# Patient Record
Sex: MALE | Race: OTHER | Employment: UNEMPLOYED | URBAN - METROPOLITAN AREA
[De-identification: names, ages, dates, MRNs, and addresses within clinical notes are randomized per-mention and may not be internally consistent; named-entity substitution may affect disease eponyms.]

---

## 2020-05-26 ENCOUNTER — HOSPITAL ENCOUNTER (EMERGENCY)
Age: 25
Discharge: HOME | End: 2020-05-26
Attending: EMERGENCY MEDICINE
Payer: SELF-PAY

## 2020-05-26 VITALS
OXYGEN SATURATION: 99 % | RESPIRATION RATE: 18 BRPM | WEIGHT: 180 LBS | DIASTOLIC BLOOD PRESSURE: 63 MMHG | BODY MASS INDEX: 29.99 KG/M2 | HEIGHT: 65 IN | HEART RATE: 79 BPM | SYSTOLIC BLOOD PRESSURE: 108 MMHG | TEMPERATURE: 98.6 F

## 2020-05-26 DIAGNOSIS — T78.2XXA ANAPHYLAXIS, INITIAL ENCOUNTER: Primary | ICD-10-CM

## 2020-05-26 PROCEDURE — 96375 TX/PRO/DX INJ NEW DRUG ADDON: CPT

## 2020-05-26 PROCEDURE — 99284 EMERGENCY DEPT VISIT MOD MDM: CPT

## 2020-05-26 PROCEDURE — 96374 THER/PROPH/DIAG INJ IV PUSH: CPT

## 2020-05-26 PROCEDURE — 96372 THER/PROPH/DIAG INJ SC/IM: CPT

## 2020-05-26 PROCEDURE — 74011000250 HC RX REV CODE- 250: Performed by: EMERGENCY MEDICINE

## 2020-05-26 PROCEDURE — 74011250636 HC RX REV CODE- 250/636: Performed by: EMERGENCY MEDICINE

## 2020-05-26 RX ORDER — EPINEPHRINE 0.3 MG/.3ML
0.3 INJECTION SUBCUTANEOUS
Qty: 2 SYRINGE | Refills: 2 | Status: SHIPPED | OUTPATIENT
Start: 2020-05-26 | End: 2020-05-26

## 2020-05-26 RX ORDER — EPINEPHRINE 1 MG/ML
0.3 INJECTION, SOLUTION, CONCENTRATE INTRAVENOUS ONCE
Status: COMPLETED | OUTPATIENT
Start: 2020-05-26 | End: 2020-05-26

## 2020-05-26 RX ADMIN — EPINEPHRINE 0.3 MG: 1 INJECTION, SOLUTION, CONCENTRATE INTRAVENOUS at 16:00

## 2020-05-26 RX ADMIN — SODIUM CHLORIDE 1000 ML: 900 INJECTION, SOLUTION INTRAVENOUS at 15:53

## 2020-05-26 RX ADMIN — FAMOTIDINE 20 MG: 10 INJECTION, SOLUTION INTRAVENOUS at 16:01

## 2020-05-26 RX ADMIN — METHYLPREDNISOLONE SODIUM SUCCINATE 125 MG: 125 INJECTION, POWDER, FOR SOLUTION INTRAMUSCULAR; INTRAVENOUS at 16:00

## 2020-05-26 NOTE — ED NOTES
Attempted to get a hold of pt again and pt answered. States he will come back to get that epi pen. States he took both IV's out before he left.

## 2020-05-26 NOTE — ED TRIAGE NOTES
Pt arrives complaining of being stung by a bee. States he is unable \"to breath or see\". Pt extremely anxious on arrival. Pt with red arms and hives to arms. No facial swelling noted. Pt given 50mg Benadryl PO by family PTA.

## 2020-05-26 NOTE — DISCHARGE INSTRUCTIONS
Patient Education     Meds in our department:  Epinephrine  Pepcid  Solu-Medrol    Meds for home:  Benadryl 25 to 50 mg every 6-8 hours  Pepcid 20 mg every 12 hours    Return at any point with worsening, you are leaving before we do only discharge you at your specific request acknowledging that there is increased risk    EpiPen for acute reactions similar   Anaphylactic Reaction: Care Instructions  Your Care Instructions    A bad allergic reaction affects your whole body. Doctors call it an anaphylactic reaction. Your immune system may have reacted to food or medicine. Or maybe you had an insect bite or sting. This kind of reaction can happen the first time you come into contact with a substance. Or it may take many times before a substance causes a problem. You need to get help right away if your body reacts like this again. Follow-up care is a key part of your treatment and safety. Be sure to make and go to all appointments, and call your doctor if you are having problems. It's also a good idea to know your test results and keep a list of the medicines you take. How can you care for yourself at home? · If your doctor has prescribed medicine, such as an antihistamine, take it exactly as directed. Call your doctor if you think you are having a problem with your medicine. · Learn all you can about your allergies. You may be able to avoid a severe response when you do or don't do certain things. For instance, you can check food or drug labels for contents that might cause problems. · Your doctor may prescribe a shot of epinephrine to carry with you in case you have a severe reaction. Learn how to give yourself the shot. Keep it with you at all times. Make sure it has not . · Wear medical alert jewelry that lists your allergies. You can buy this at most [employer]. · Teach your family and friends about your allergies. Tell them what you need to avoid.  Teach them what to do if you have a reaction. · Before you take any medicine, tell your doctor if you have had a bad response to any medicines in the past.  When should you call for help? Give an epinephrine shot if:    · You think you are having a severe allergic reaction.    After giving an epinephrine shot call  911, even if you feel better.   Call 911 if:    · You have symptoms of a severe allergic reaction. These may include:  ? Sudden raised, red areas (hives) all over your body. ? Swelling of the throat, mouth, lips, or tongue. ? Trouble breathing. ? Passing out (losing consciousness). Or you may feel very lightheaded or suddenly feel weak, confused, or restless.     · You have been given an epinephrine shot, even if you feel better.    Call your doctor now or seek immediate medical care if:    · You have symptoms of an allergic reaction, such as:  ? A rash or hives (raised, red areas on the skin). ? Itching. ? Swelling. ? Belly pain, nausea, or vomiting.    Watch closely for changes in your health, and be sure to contact your doctor if:    · You do not get better as expected. Where can you learn more? Go to http://meredith-marjorie.info/  Enter E186 in the search box to learn more about \"Anaphylactic Reaction: Care Instructions. \"  Current as of: October 6, 2019Content Version: 12.4  © 3571-8885 [employer]. Care instructions adapted under license by [employer] (which disclaims liability or warranty for this information). If you have questions about a medical condition or this instruction, always ask your healthcare professional. [address] any warranty or liability for your use of this information.

## 2020-05-26 NOTE — ED NOTES
Went into room to discharge pt. Pt not in the room. One of his two IV's are on the bed. Will attempt to locate pt to give discharge papers.

## 2020-05-27 NOTE — ED PROVIDER NOTES
Before coming over the patient was stung by either 1 or 2 bees. He has no history of anaphylactic reaction but had significant event following these. He developed hives and diffuse rash became somewhat confused and was actually symptomatically hypotensive on arrival.  No history of allergic reactions in the past he has no cardiac history is baseline very healthy. No significant wheezing or airway issue    The history is provided by the patient. Allergic Reaction    This is a new problem. The current episode started less than 1 hour ago. Ingested substance: Bee sting. Pertinent negatives include no confusion. History reviewed. No pertinent past medical history. History reviewed. No pertinent surgical history. History reviewed. No pertinent family history.     Social History     Socioeconomic History    Marital status: SINGLE     Spouse name: Not on file    Number of children: Not on file    Years of education: Not on file    Highest education level: Not on file   Occupational History    Not on file   Social Needs    Financial resource strain: Not on file    Food insecurity     Worry: Not on file     Inability: Not on file    Transportation needs     Medical: Not on file     Non-medical: Not on file   Tobacco Use    Smoking status: Never Smoker    Smokeless tobacco: Never Used   Substance and Sexual Activity    Alcohol use: Never     Frequency: Never    Drug use: Not on file    Sexual activity: Not on file   Lifestyle    Physical activity     Days per week: Not on file     Minutes per session: Not on file    Stress: Not on file   Relationships    Social connections     Talks on phone: Not on file     Gets together: Not on file     Attends Muslim service: Not on file     Active member of club or organization: Not on file     Attends meetings of clubs or organizations: Not on file     Relationship status: Not on file    Intimate partner violence     Fear of current or ex partner: Not on file     Emotionally abused: Not on file     Physically abused: Not on file     Forced sexual activity: Not on file   Other Topics Concern    Not on file   Social History Narrative    Not on file         ALLERGIES: Patient has no known allergies. Review of Systems   Constitutional: Negative for chills and fever. HENT: Negative for trouble swallowing. Respiratory: Negative for wheezing. Cardiovascular: Negative. Genitourinary: Negative. Musculoskeletal: Negative. Skin: Positive for color change and rash. Psychiatric/Behavioral: Negative for behavioral problems, confusion and decreased concentration. All other systems reviewed and are negative. Vitals:    05/26/20 1626 05/26/20 1636 05/26/20 1706 05/26/20 1712   BP: 114/58 116/59 108/63    Pulse:       Resp:       Temp:       SpO2: 99% 99% 99% 99%   Weight:       Height:                Physical Exam  Vitals signs and nursing note reviewed. Constitutional:       General: He is in acute distress. Appearance: He is ill-appearing. HENT:      Head: Atraumatic. Nose: Nose normal.      Mouth/Throat:      Comments: No airway issues  Eyes:      General: No scleral icterus. Neck:      Musculoskeletal: Normal range of motion. No neck rigidity. Cardiovascular:      Rate and Rhythm: Normal rate and regular rhythm. Pulmonary:      Effort: No respiratory distress. Breath sounds: No wheezing. Abdominal:      General: Abdomen is flat. Musculoskeletal: Normal range of motion. Skin:     Coloration: Skin is not jaundiced. Findings: Erythema present. Neurological:      Mental Status: He is alert. Mental status is at baseline. Psychiatric:         Thought Content:  Thought content normal.         Judgment: Judgment normal.          MDM  Number of Diagnoses or Management Options  Anaphylaxis, initial encounter:   Diagnosis management comments: Anaphylactic reaction to bee sting responsive to ER therapy though patient unwilling to wait for appropriate observation. And actually is self discharged due to worries of COVID    Risk of Complications, Morbidity, and/or Mortality  Presenting problems: high  Management options: moderate    Critical Care  Total time providing critical care: 30-74 minutes (CRITICAL CARE Documentation: This patient is critically ill and there is a high probability of of imminent or life threatening deterioration in the patient's condition without immediate management. The nature of the patient's clinical problem is: Cute anaphylactic reaction with transient hypotension    I have spent 50 minutes in direct patient care, documentation, review of labs/xrays/old records, discussion with patient and accompanying female friend. The time involved in the performance of separately reportable procedures was not counted toward critical care time.      Melanie Bauer MD; 5/26/2020 @11:50 PM    )    Patient Progress  Patient progress: improved         Procedures